# Patient Record
Sex: MALE | Race: WHITE | Employment: UNEMPLOYED | URBAN - METROPOLITAN AREA
[De-identification: names, ages, dates, MRNs, and addresses within clinical notes are randomized per-mention and may not be internally consistent; named-entity substitution may affect disease eponyms.]

---

## 2020-06-24 ENCOUNTER — OFFICE VISIT (OUTPATIENT)
Dept: FAMILY MEDICINE CLINIC | Age: 65
End: 2020-06-24

## 2020-06-24 DIAGNOSIS — M79.671 RIGHT FOOT PAIN: Primary | ICD-10-CM

## 2020-06-24 RX ORDER — MELOXICAM 15 MG/1
15 TABLET ORAL DAILY
Qty: 30 TAB | Refills: 2 | Status: SHIPPED | OUTPATIENT
Start: 2020-06-24

## 2020-06-24 NOTE — PROGRESS NOTES
Coordination of Care  1. Have you been to the ER, urgent care clinic since your last visit? Hospitalized since your last visit? No    2. Have you seen or consulted any other health care providers outside of the [address] [place] [address] since your last visit? Include any pap smears or colon screening. No    Does the patient need refills? NO    Learning Assessment Complete? no    Int # [id number]. Explained procedure for obtaining xray as a walk-in and told pt to go to Outpatient Registration. Provided address and directions to facility. Told pt that someone will call them after we get results. Told pt to ask about the care card which is our financial assistance program.  Also told pt that they will be required to do a financial screening  Also told them that if they get a bill before they get their card to call the phone # on the bill to let them know they have applied for the Care Card.  Jonathan Benavides RN

## 2020-06-25 ENCOUNTER — HOSPITAL ENCOUNTER (OUTPATIENT)
Dept: GENERAL RADIOLOGY | Age: 65
Discharge: HOME | End: 2020-06-25
Payer: SELF-PAY

## 2020-06-25 DIAGNOSIS — M79.671 RIGHT FOOT PAIN: ICD-10-CM

## 2020-06-25 PROCEDURE — 73630 X-RAY EXAM OF FOOT: CPT

## 2020-06-25 NOTE — PROGRESS NOTES
Progress Note    Subjective:   Daily Progress Note: 6/24/2020 9:15 PM    Patient agrees to a Virtual or Tele-health visit    Right foot pain in the evenings, no: trauma, redness, swelling or sores  Not working at this time    Current Outpatient Medications   Medication Sig    meloxicam (MOBIC) 15 mg tablet Take 1 Tab by mouth daily. Indications: joint damage causing pain and loss of function     No current facility-administered medications for this visit. Review of Systems  Pertinent items are noted in HPI. Objective: There were no vitals taken for this visit. NO PE        There were no vitals taken for this visit. Additional comments:None    Data Review    No results found for this or any previous visit (from the past 24 hour(s)). Assessment/Plan:     Right foot pain    Plan: meloxicam 15mg, arrange right foot xray, return visit 4-6 weeks      Care Plan discussed with: Patient/Family    Total time spent with patient: 20 minutes.

## 2020-06-26 NOTE — PROGRESS NOTES
Please inform the pt that his xray shows some mild arthritis but no concerns for breaks or fractures.  (he was seen by Dr Olimpia Davis)

## 2020-07-02 NOTE — PROGRESS NOTES
[id number]. Tc to the pt. The xray results were shared with the pt from the provider. The pt was asked how the pain in his foot was. If the medication had helped his pain. The pt stated he did not explain himself correctly to the provider during his virtual visit. The pt reported to the nurse that his pain happened in right hip/ buttocks and goes down to the bottom of the foot. The pt stated the xray was only of his foot. The pt stated when he had the pain he takes the pill and the pain goes away. The medication was reviewed with the pt. The pt wanted to know if he should have another xray done of his hip. The pt was told the provider would be sent the message.  Howie Larsen RN

## 2020-07-10 ENCOUNTER — TELEPHONE (OUTPATIENT)
Dept: FAMILY MEDICINE CLINIC | Age: 65
End: 2020-07-10

## 2020-07-10 DIAGNOSIS — R52 RADIATING PAIN: Primary | ICD-10-CM

## 2020-07-10 NOTE — TELEPHONE ENCOUNTER
Int # [id number]. Tc told the pt he has an xray order for his hip. He can go to the same place he got the Xray of his foot. Th ept was advised to go M-FR 8a-4;30pm The pt verbalized understanding.  Gareth Montes RN

## 2020-07-10 NOTE — PROGRESS NOTES
Xray right hip added per conversation with nurse and patient, he felt that his pain was generating from his hip and radiating to his foot, which was not as he described it to me initially in our virtual visit.   Will follow for results

## 2020-07-13 ENCOUNTER — HOSPITAL ENCOUNTER (OUTPATIENT)
Dept: GENERAL RADIOLOGY | Age: 65
Discharge: HOME | End: 2020-07-13
Payer: SELF-PAY

## 2020-07-13 DIAGNOSIS — R52 RADIATING PAIN: ICD-10-CM

## 2020-07-13 PROCEDURE — 73502 X-RAY EXAM HIP UNI 2-3 VIEWS: CPT

## 2020-07-14 NOTE — PROGRESS NOTES
His hip xray is negative, his foot xray shows mild osteoarthritic changes. Please call the pt and see if he wants to schedule f/up with Dr Michelle Desai, who was following the pt.  Thank you

## 2020-07-20 NOTE — PROGRESS NOTES
Int # [id number]. Tc 2x to the pt. No answer and no voice mail set up. No message could be left. A result note letter was printed and mailed to the pt along with the providers result note message to contact the office if he would like a follow up appt with Dr Jerry Cui.  Brigid Vegas RN

## 2020-07-23 ENCOUNTER — TELEPHONE (OUTPATIENT)
Dept: FAMILY MEDICINE CLINIC | Age: 65
End: 2020-07-23

## 2020-07-23 NOTE — TELEPHONE ENCOUNTER
Tc to the pt with the assistance of Int # [id number]. The pt was called and given the providers result note of the xrays. The pt stated he has only one more pill left of the medication. The pt asked if he was going to be given a new medication for the pain. The pt's chart was reviewed. The pt had been prescribed Meloxicam 15mg, 1 tab daily #30 with 2 refills on 06/24/20. The pt was told the provider had not ordered any new medicine for him. He has 2 refills left on the Meloxicam. The pt asked if he should get the refills or should he get a new medicine? The pt was told he should get the refills unless they were not helping him. The pt asked several times about getting a refill or getting a new medicine. The pt was advised to get the refills. The pt was asked if he would like to f/u with Dr Jodi Mead and he stated he would the pt was told a registrar would call him to schedule the appt for next month the date will be 08/26/20. The pt agreed he could have the appt that date. Routed a staff message to the [address] [place] [address] office to please call the pt and schedule a f/u with Dr Jodi Mead for next month.  Nikki Cary RN

## 2020-07-23 NOTE — TELEPHONE ENCOUNTER
Sarah Salinas   call  Main office 7/23/2020 patient wanted to know if he can talk to a provider or a nurse about some results  He received yesterday  Patient is concert about those results  . Patient will be waiting for a phone call .     Thank you   Denver Kurtz

## 2020-09-23 ENCOUNTER — TELEPHONE (OUTPATIENT)
Dept: FAMILY MEDICINE CLINIC | Age: 65
End: 2020-09-23

## 2020-09-23 NOTE — TELEPHONE ENCOUNTER
F/u per Dr. Jodi Mead (from wait list 8/17). Was not able to leave message today. Message said \"not available\" - try again later.

## 2020-11-10 ENCOUNTER — TELEPHONE (OUTPATIENT)
Dept: FAMILY MEDICINE CLINIC | Age: 65
End: 2020-11-10

## 2020-11-10 NOTE — TELEPHONE ENCOUNTER
----- Message from Osman Boothe sent at 11/10/2020 11:59 AM EST -----  This was  on  wait list  which I worked on in September but patient never called us back for his follow up. Tried again today, but wireless customer not available.         Airam Aleman

## 2020-11-10 NOTE — TELEPHONE ENCOUNTER
The pt was called by the cbn. No answer. It went to a [employer] message that says the  deepikaur trying to reach is unavailable. There was no opportunity to leave a message.  Diego Nash RN

## 2023-05-23 RX ORDER — MELOXICAM 15 MG/1
15 TABLET ORAL DAILY
COMMUNITY
Start: 2020-06-24